# Patient Record
Sex: FEMALE | Race: BLACK OR AFRICAN AMERICAN | NOT HISPANIC OR LATINO | Employment: STUDENT | URBAN - METROPOLITAN AREA
[De-identification: names, ages, dates, MRNs, and addresses within clinical notes are randomized per-mention and may not be internally consistent; named-entity substitution may affect disease eponyms.]

---

## 2022-05-12 ENCOUNTER — PROCEDURE VISIT (OUTPATIENT)
Dept: OBSTETRICS AND GYNECOLOGY | Facility: CLINIC | Age: 22
End: 2022-05-12
Payer: MEDICAID

## 2022-05-12 VITALS — HEIGHT: 61 IN | WEIGHT: 158.75 LBS | BODY MASS INDEX: 29.97 KG/M2

## 2022-05-12 DIAGNOSIS — Z30.09 ENCOUNTER FOR OTHER GENERAL COUNSELING OR ADVICE ON CONTRACEPTION: ICD-10-CM

## 2022-05-12 DIAGNOSIS — Z20.2 POSSIBLE EXPOSURE TO STD: ICD-10-CM

## 2022-05-12 DIAGNOSIS — Z30.46 NEXPLANON REMOVAL: Primary | ICD-10-CM

## 2022-05-12 PROCEDURE — 99203 OFFICE O/P NEW LOW 30 MIN: CPT | Mod: 25,S$PBB,, | Performed by: OBSTETRICS & GYNECOLOGY

## 2022-05-12 PROCEDURE — 11982 REMOVAL OF NEXPLANON DEVICE: ICD-10-PCS | Mod: S$PBB,,, | Performed by: OBSTETRICS & GYNECOLOGY

## 2022-05-12 PROCEDURE — 87491 CHLMYD TRACH DNA AMP PROBE: CPT | Performed by: OBSTETRICS & GYNECOLOGY

## 2022-05-12 PROCEDURE — 11982 REMOVE DRUG IMPLANT DEVICE: CPT | Mod: PBBFAC,PN | Performed by: OBSTETRICS & GYNECOLOGY

## 2022-05-12 PROCEDURE — 99203 PR OFFICE/OUTPT VISIT, NEW, LEVL III, 30-44 MIN: ICD-10-PCS | Mod: 25,S$PBB,, | Performed by: OBSTETRICS & GYNECOLOGY

## 2022-05-12 PROCEDURE — 87591 N.GONORRHOEAE DNA AMP PROB: CPT | Performed by: OBSTETRICS & GYNECOLOGY

## 2022-05-12 NOTE — PROGRESS NOTES
"Past medical, surgical, social, family, and obstetric histories; medications; prior records and results; and available outside records were reviewed and updated in the EMR.  Pertinent findings were noted below.    Reason for Visit   Contraception (REMOVAL NEXPLANON ) and STD CHECK    HPI   21 y.o. female     Patient's last menstrual period was 2022.     Nexplanon in place for 3 years.  Pt presents for removal.   She would like to discuss contraceptive options.  She plans to take a birth control break for a few months.   Desires STD testing.    Contraception: Nexplanon  Pap: No result found, <21  Mammogram: N/A    Exam   Ht 5' 1" (1.549 m)   Wt 72 kg (158 lb 11.7 oz)   LMP 2022   BMI 29.99 kg/m²     Assessment and Plan   Nexplanon removal  -     Removal of Nexplanon Device    Encounter for other general counseling or advice on contraception    Possible exposure to STD  -     C. trachomatis/N. gonorrhoeae by AMP DNA [facility]; Urine  -     HIV 1/2 Ag/Ab (4th Gen); Future; Expected date: 2022  -     RPR; Future; Expected date: 2022       Nexplanon removed without difficulty: see separate procedure note.   Counseled on options.  Pt is considering another Nexplanon.  She will notify clinic if she decides to get another.   Labs as above.      "

## 2022-05-12 NOTE — PROCEDURES
Removal of Nexplanon Device    Date/Time: 5/12/2022 11:15 AM  Performed by: DAMASO Bcukley MD  Authorized by: DAMASO Buckley MD   Local anesthesia used: yes  Anesthesia: local infiltration    Anesthesia:  Local anesthesia used: yes  Local Anesthetic: lidocaine 1% with epinephrine  Anesthetic total: 2 mL    Sedation:  Patient sedated: no    Patient tolerance: patient tolerated the procedure well with no immediate complications        Patient counseled on risks of procedure including pain, bleeding, and infection.  Patient acknowledges risks and wishes to proceed.  All questions answered.  Consents signed.    Device palpated in patient's LEFT arm.  Area prepped with povidine iodine.  Site injected with 2-cc of lidocaine.  2-mm incision made at the distal end of the device.  The device end was manipulated through the incision and grasped with hemostats, and the device was removed intact and measured 4-cm.  Bandage applied to the incision and wrapped.  Patient instructed to keep the bandage on for 24-hrs.  Procedure tolerated well.

## 2022-05-15 LAB
C TRACH DNA SPEC QL NAA+PROBE: NOT DETECTED
N GONORRHOEA DNA SPEC QL NAA+PROBE: NOT DETECTED

## 2025-03-21 PROBLEM — N63.0 BREAST MASS IN FEMALE: Status: ACTIVE | Noted: 2025-03-21

## 2025-03-21 PROBLEM — N64.52 NIPPLE DISCHARGE IN FEMALE: Status: ACTIVE | Noted: 2025-03-21

## 2025-03-21 PROBLEM — N64.4 BREAST PAIN, RIGHT: Status: ACTIVE | Noted: 2025-03-21

## 2025-03-23 ENCOUNTER — HOSPITAL ENCOUNTER (EMERGENCY)
Facility: OTHER | Age: 25
Discharge: HOME | End: 2025-03-24
Attending: EMERGENCY MEDICINE
Payer: MEDICAID

## 2025-03-23 DIAGNOSIS — N61.0 CELLULITIS OF RIGHT BREAST: Primary | ICD-10-CM

## 2025-03-23 LAB
ABSOLUTE EOSINOPHIL (OHS): 0.18 K/UL
ABSOLUTE MONOCYTE (OHS): 0.93 K/UL (ref 0.3–1)
ABSOLUTE NEUTROPHIL COUNT (OHS): 4.1 K/UL (ref 1.8–7.7)
ANION GAP (OHS): 12 MMOL/L (ref 8–16)
B-HCG UR QL: NEGATIVE
BASOPHILS # BLD AUTO: 0.04 K/UL
BASOPHILS NFR BLD AUTO: 0.4 %
BUN SERPL-MCNC: 17 MG/DL (ref 6–20)
CALCIUM SERPL-MCNC: 9.1 MG/DL (ref 8.7–10.5)
CHLORIDE SERPL-SCNC: 107 MMOL/L (ref 95–110)
CO2 SERPL-SCNC: 19 MMOL/L (ref 23–29)
CREAT SERPL-MCNC: 0.8 MG/DL (ref 0.5–1.4)
CTP QC/QA: YES
ERYTHROCYTE [DISTWIDTH] IN BLOOD BY AUTOMATED COUNT: 14.4 % (ref 11.5–14.5)
GFR SERPLBLD CREATININE-BSD FMLA CKD-EPI: >60 ML/MIN/1.73/M2
GLUCOSE SERPL-MCNC: 79 MG/DL (ref 70–110)
HCT VFR BLD AUTO: 35.5 % (ref 37–48.5)
HGB BLD-MCNC: 11.3 GM/DL (ref 12–16)
IMM GRANULOCYTES # BLD AUTO: 0.03 K/UL (ref 0–0.04)
IMM GRANULOCYTES NFR BLD AUTO: 0.3 % (ref 0–0.5)
LDH SERPL L TO P-CCNC: 0.58 MMOL/L (ref 0.5–2.2)
LYMPHOCYTES # BLD AUTO: 3.68 K/UL (ref 1–4.8)
MCH RBC QN AUTO: 26.9 PG (ref 27–50)
MCHC RBC AUTO-ENTMCNC: 31.8 G/DL (ref 32–36)
MCV RBC AUTO: 85 FL (ref 82–98)
NUCLEATED RBC (/100WBC) (OHS): 0 /100 WBC
PLATELET # BLD AUTO: 246 K/UL (ref 150–450)
PMV BLD AUTO: 11.5 FL (ref 9.2–12.9)
POTASSIUM SERPL-SCNC: 4.5 MMOL/L (ref 3.5–5.1)
RBC # BLD AUTO: 4.2 M/UL (ref 4–5.4)
RELATIVE EOSINOPHIL (OHS): 2 %
RELATIVE LYMPHOCYTE (OHS): 41.1 % (ref 18–48)
RELATIVE MONOCYTE (OHS): 10.4 % (ref 4–15)
RELATIVE NEUTROPHIL (OHS): 45.8 % (ref 38–73)
SAMPLE: NORMAL
SODIUM SERPL-SCNC: 138 MMOL/L (ref 136–145)
WBC # BLD AUTO: 8.96 K/UL (ref 3.9–12.7)

## 2025-03-23 PROCEDURE — 96375 TX/PRO/DX INJ NEW DRUG ADDON: CPT

## 2025-03-23 PROCEDURE — 85025 COMPLETE CBC W/AUTO DIFF WBC: CPT | Performed by: EMERGENCY MEDICINE

## 2025-03-23 PROCEDURE — 80048 BASIC METABOLIC PNL TOTAL CA: CPT | Performed by: EMERGENCY MEDICINE

## 2025-03-23 PROCEDURE — 81025 URINE PREGNANCY TEST: CPT | Performed by: EMERGENCY MEDICINE

## 2025-03-23 PROCEDURE — 63600175 PHARM REV CODE 636 W HCPCS: Mod: JZ,TB | Performed by: EMERGENCY MEDICINE

## 2025-03-23 PROCEDURE — 99285 EMERGENCY DEPT VISIT HI MDM: CPT | Mod: 25

## 2025-03-23 RX ORDER — VANCOMYCIN 1.75 GRAM/500 ML IN 0.9 % SODIUM CHLORIDE INTRAVENOUS
1750
Status: COMPLETED | OUTPATIENT
Start: 2025-03-24 | End: 2025-03-24

## 2025-03-23 RX ORDER — KETOROLAC TROMETHAMINE 30 MG/ML
30 INJECTION, SOLUTION INTRAMUSCULAR; INTRAVENOUS
Status: COMPLETED | OUTPATIENT
Start: 2025-03-23 | End: 2025-03-23

## 2025-03-23 RX ADMIN — KETOROLAC TROMETHAMINE 30 MG: 30 INJECTION, SOLUTION INTRAMUSCULAR; INTRAVENOUS at 09:03

## 2025-03-24 VITALS
SYSTOLIC BLOOD PRESSURE: 136 MMHG | WEIGHT: 155 LBS | BODY MASS INDEX: 29.27 KG/M2 | TEMPERATURE: 98 F | DIASTOLIC BLOOD PRESSURE: 85 MMHG | RESPIRATION RATE: 20 BRPM | HEIGHT: 61 IN | OXYGEN SATURATION: 100 % | HEART RATE: 71 BPM

## 2025-03-24 PROCEDURE — 25000003 PHARM REV CODE 250: Performed by: EMERGENCY MEDICINE

## 2025-03-24 PROCEDURE — 96365 THER/PROPH/DIAG IV INF INIT: CPT

## 2025-03-24 PROCEDURE — 63600175 PHARM REV CODE 636 W HCPCS: Performed by: EMERGENCY MEDICINE

## 2025-03-24 PROCEDURE — 96366 THER/PROPH/DIAG IV INF ADDON: CPT

## 2025-03-24 RX ORDER — SULFAMETHOXAZOLE AND TRIMETHOPRIM 800; 160 MG/1; MG/1
1 TABLET ORAL 2 TIMES DAILY
Qty: 14 TABLET | Refills: 0 | Status: SHIPPED | OUTPATIENT
Start: 2025-03-24 | End: 2025-03-31

## 2025-03-24 RX ADMIN — SODIUM CHLORIDE 1750 MG: 9 INJECTION, SOLUTION INTRAVENOUS at 01:03

## 2025-03-24 NOTE — DISCHARGE INSTRUCTIONS
Continue taking cephalexin that was previously prescribed.  Start taking new prescription, Bactrim DS (sulfamethoxazole/trimethoprim) in addition.  Return to the ER if you experience new or significantly worsened symptoms such as fever, increasing pain and redness, increasing swelling or other concerns.  Follow-up at the [facility] - a referral was placed, but you can call to schedule as well.  See handout for additional information.

## 2025-03-24 NOTE — ED TRIAGE NOTES
Judy Avila, a 24 y.o. female presents to the ED w/ complaint of right breast pain, pt stated that she notice discoloration of the right breast on yesterday. Pt denies N/V. Pt is AAOx4 with no acute distress noted.     Triage note:     Chief Complaint   Patient presents with    Abscess     R breast discomfort     Recently evaluated this past week & dx w/ an abscess to the right breast; discharged w/ antibiotics.  Advised to return bc the abscess is now presents with a red tint & warm to touch.     Review of patient's allergies indicates:  No Known Allergies  History reviewed. No pertinent past medical history.

## 2025-03-24 NOTE — ED PROVIDER NOTES
Encounter Date: 3/23/2025       History     Chief Complaint   Patient presents with    Abscess     R breast discomfort     Recently evaluated this past week & dx w/ an abscess to the right breast; discharged w/ antibiotics.  Advised to return bc the abscess is now presents with a red tint & warm to touch.     24-year-old female presents with complaint of increasing pain and redness to the right breast.  She states that symptoms began about 5 days ago and originally she attributed it to a nipple piercing which has been in place for several years.  She did have a small amount of drainage from the nipple.  She removed her piercing which immediately resolved drainage, and sought care at another facility.  She was placed on antibiotics.  She has been compliant with prescribed cephalexin for the last 2 days but pain has now localized to an area inferior to the nipple and now increasing redness and induration.    The history is provided by the patient.     Review of patient's allergies indicates:  No Known Allergies  History reviewed. No pertinent past medical history.  History reviewed. No pertinent surgical history.  Family History   Problem Relation Name Age of Onset    Cancer Neg Hx      Hypertension Neg Hx      Ovarian cancer Neg Hx      Stroke Neg Hx       Social History[1]  Review of Systems   Constitutional:  Positive for fever (Prior to starting antibiotics, now resolved). Negative for chills.   HENT:  Negative for congestion and sore throat.    Eyes:  Negative for visual disturbance.   Respiratory:  Negative for cough and shortness of breath.    Cardiovascular:  Negative for chest pain and palpitations.   Gastrointestinal:  Negative for abdominal pain, diarrhea and vomiting.   Genitourinary:  Negative for decreased urine volume, dysuria and vaginal discharge.   Musculoskeletal:  Negative for joint swelling, neck pain and neck stiffness.   Skin:  Positive for color change (Redness to right breast). Negative for  rash and wound.   Neurological:  Negative for weakness, numbness and headaches.   Psychiatric/Behavioral:  Negative for confusion.        Physical Exam     Initial Vitals [03/23/25 1959]   BP Pulse Resp Temp SpO2   (!) 112/58 62 16 98 °F (36.7 °C) 100 %      MAP       --         Physical Exam    Nursing note and vitals reviewed.  Constitutional: She appears well-developed and well-nourished. No distress.   HENT:   Head: Normocephalic and atraumatic. Mouth/Throat: Oropharynx is clear and moist. No oropharyngeal exudate.   Eyes: Conjunctivae and EOM are normal. Pupils are equal, round, and reactive to light.   Neck: Neck supple.   Cardiovascular:  Normal rate and normal heart sounds.           No murmur heard.  Pulmonary/Chest: Breath sounds normal. No respiratory distress. She has no wheezes. She has no rhonchi. She has no rales. She exhibits tenderness (Right breast with area of induration, erythema, and tenderness inferior and lateral to the nipple.  No nipple drainage.).   Abdominal: Abdomen is soft. There is no abdominal tenderness. There is no rebound and no guarding.   Musculoskeletal:         General: No tenderness or edema.      Cervical back: Neck supple.     Neurological: She is alert and oriented to person, place, and time. She has normal strength. GCS score is 15. GCS eye subscore is 4. GCS verbal subscore is 5. GCS motor subscore is 6.   Skin: Skin is warm and dry. No rash noted. There is erythema.   Psychiatric: She has a normal mood and affect. Thought content normal.         ED Course   Procedures  Labs Reviewed   BASIC METABOLIC PANEL - Abnormal       Result Value    Sodium 138      Potassium 4.5      Chloride 107      CO2 19 (*)     Glucose 79      BUN 17      Creatinine 0.8      Calcium 9.1      Anion Gap 12      eGFR >60     CBC WITH DIFFERENTIAL - Abnormal    WBC 8.96      RBC 4.20      HGB 11.3 (*)     HCT 35.5 (*)     MCV 85      MCH 26.9 (*)     MCHC 31.8 (*)     RDW 14.4      Platelet Count  246      MPV 11.5      Nucleated RBC 0      Neut % 45.8      Lymph % 41.1      Mono % 10.4      Eos % 2.0      Basophil % 0.4      Imm Grans % 0.3      Neut # 4.10      Lymph # 3.68      Mono # 0.93      Eos # 0.18      Baso # 0.04      Imm Grans # 0.03     POCT URINE PREGNANCY - Normal    POC Preg Test, Ur Negative       Acceptable Yes     CBC W/ AUTO DIFFERENTIAL    Narrative:     The following orders were created for panel order CBC auto differential.  Procedure                               Abnormality         Status                     ---------                               -----------         ------                     CBC with Differential[[id number]]       Abnormal            Final result                 Please view results for these tests on the individual orders.   ISTAT LACTATE    POC Lactate 0.58      Sample VENOUS            Imaging Results              US Chest Mediastinum (Final result)  Result time 03/23/25 23:42:39      Final result by Shagufta Castrejon MD (03/23/25 23:42:39)                   Impression:      See above.      Electronically signed by: Shagufta Castrejon MD  Date:    03/23/2025  Time:    23:42               Narrative:    EXAMINATION:  US CHEST MEDIASTINUM    CLINICAL HISTORY:  breast pain/redness/swelling right;    TECHNIQUE:  Limited ultrasound evaluation was performed of the right breast region of concern.    COMPARISON:  None    FINDINGS:  Limited ultrasound evaluation was performed of the right breast region of concern.  No clear organized collection or abscess seen.  There is some soft tissue heterogeneity and suspected edema in the region of concern.  Future follow-up may be obtained as clinically indicated.                                       Medications   vancomycin 1750 mg in 0.9% sodium chloride 500 mL IVPB (has no administration in time range)   ketorolac injection 30 mg (30 mg Intravenous Given 3/23/25 2145)     Medical Decision Making  Emergent evaluation  a 24-year-old female with reported infection of her right breast.  She is not currently pregnant or breastfeeding.  Vital signs are benign, afebrile.  On exam there is a tender, indurated, erythematous area to the right lower lateral breast which is not well circumscribed.  There is no punctum, overlying skin is in good condition.  There is no nipple discharge.  Ultrasound shows no drainable collection.  I do not think this truly represents outpatient antibiotic treatment failure since current antibiotic regimen did not cover for Staphylococcus/MRSA.  She is given a dose of vancomycin in the ED, will discharge with addition of Bactrim to her regimen.  Referral is made to the [facility] for follow-up and she is encouraged to return immediately for new or worsening symptoms.  Labs today are reassuring with no leukocytosis or elevated lactate.    Amount and/or Complexity of Data Reviewed  Labs: ordered. Decision-making details documented in ED Course.  Radiology: ordered.    Risk  Prescription drug management.               ED Course as of 03/24/25 0056   Sun Mar 23, 2025   2159 POCT urine pregnancy  Reviewed and negative [AK]   2159 Basic metabolic panel(!)  Reviewed and benign [AK]   2159 CBC auto differential(!)  Reviewed.  No leukocytosis.  Mild anemia present.  Normal platelet count. [AK]   Mon Mar 24, 2025   0030 ISTAT Lactate  Reviewed and negative [AK]      ED Course User Index  [AK] Sherrie Espinoza MD               Medical Decision Making:   Differential Diagnosis:   Includes but not limited to cellulitis, abscess, allergic reaction, sepsis, mastitis, breast mass, others             Clinical Impression:  Final diagnoses:  [N61.0] Cellulitis of right breast (Primary)          ED Disposition Condition    Discharge Stable          ED Prescriptions       Medication Sig Dispense Start Date End Date Auth. Provider    sulfamethoxazole-trimethoprim 800-160mg (BACTRIM DS) 800-160 mg Tab Take 1 tablet by  mouth 2 (two) times daily. for 7 days 14 tablet 3/24/2025 3/31/2025 Sherrie Espinoza MD          Follow-up Information       Follow up With Specialties Details Why Contact Info Additional Information    [facility] Schedule an appointment as soon as possible for a visit  As needed [id number]  [facility] [address] [phone] Please park in the [facility] surface lot on the [address] side. Follow signage to the [facility] entrance facing the [address]. Check in on the 1st oor.    [facility] Emergency Medicine  As needed, If symptoms worsen [address]  [facility] [address] [phone]                [1]   Social History  Tobacco Use    Smoking status: Never    Smokeless tobacco: Never   Substance Use Topics    Alcohol use: Yes    Drug use: Never        Sherrie Espinoza MD  03/24/25 0056

## 2025-03-24 NOTE — ED NOTES
Assess pt for John Paul Syndrome due to vancomycin infusion, pt stated that she was itching prior to vancomycin infusion.

## 2025-03-24 NOTE — ED NOTES
"Pt c/o of getting bite by bugs in the room. Pt stated that " you guys had bedbugs, I'm ready to go" pt stated that while she was laying in the bed there was buys biting her in the back. Assess pt back: her back appears to be red from itching. Charge notified.   "